# Patient Record
Sex: MALE | Race: WHITE | URBAN - NONMETROPOLITAN AREA
[De-identification: names, ages, dates, MRNs, and addresses within clinical notes are randomized per-mention and may not be internally consistent; named-entity substitution may affect disease eponyms.]

---

## 2018-09-05 ENCOUNTER — OFFICE VISIT (OUTPATIENT)
Dept: URBAN - NONMETROPOLITAN AREA CLINIC 10 | Facility: CLINIC | Age: 63
End: 2018-09-05
Payer: COMMERCIAL

## 2018-09-05 DIAGNOSIS — E11.9 TYPE 2 DIABETES MELLITUS W/O COMPLICATION: ICD-10-CM

## 2018-09-05 PROCEDURE — 92015 DETERMINE REFRACTIVE STATE: CPT | Performed by: OPTOMETRIST

## 2018-09-05 PROCEDURE — 92014 COMPRE OPH EXAM EST PT 1/>: CPT | Performed by: OPTOMETRIST

## 2018-09-05 ASSESSMENT — INTRAOCULAR PRESSURE
OS: 16
OD: 18

## 2018-09-05 ASSESSMENT — VISUAL ACUITY
OD: 20/30
OS: 20/60

## 2018-09-05 NOTE — IMPRESSION/PLAN
Impression: Type 2 diabetes mellitus w/o complication: A82.6.  Plan: Pt under care of Dr. Darion Ordonez

## 2020-11-13 ENCOUNTER — OFFICE VISIT (OUTPATIENT)
Dept: URBAN - NONMETROPOLITAN AREA CLINIC 10 | Facility: CLINIC | Age: 65
End: 2020-11-13

## 2020-11-13 DIAGNOSIS — H43.813 VITREOUS DEGENERATION, BILATERAL: ICD-10-CM

## 2020-11-13 DIAGNOSIS — H35.30 UNSPECIFIED MACULAR DEGENERATION: ICD-10-CM

## 2020-11-13 DIAGNOSIS — H26.491 OTHER SECONDARY CATARACT, RIGHT EYE: ICD-10-CM

## 2020-11-13 PROCEDURE — 92014 COMPRE OPH EXAM EST PT 1/>: CPT | Performed by: OPTOMETRIST

## 2020-11-13 ASSESSMENT — INTRAOCULAR PRESSURE
OD: 17
OS: 17

## 2020-11-13 NOTE — IMPRESSION/PLAN
Impression: Type 2 diabetes mellitus w/o complication: G33.2. Plan: Diabetes type II: no background retinopathy, no signs of neovascularization noted. Discussed ocular and systemic benefits of blood sugar control. COntinue care w/ Dr. Siddiqi Messenger

## 2022-01-19 ENCOUNTER — OFFICE VISIT (OUTPATIENT)
Dept: URBAN - NONMETROPOLITAN AREA CLINIC 10 | Facility: CLINIC | Age: 67
End: 2022-01-19
Payer: MEDICARE

## 2022-01-19 DIAGNOSIS — Z96.1 PRESENCE OF INTRAOCULAR LENS: Primary | ICD-10-CM

## 2022-01-19 DIAGNOSIS — H52.4 PRESBYOPIA: ICD-10-CM

## 2022-01-19 PROCEDURE — 92012 INTRM OPH EXAM EST PATIENT: CPT | Performed by: STUDENT IN AN ORGANIZED HEALTH CARE EDUCATION/TRAINING PROGRAM

## 2022-01-19 ASSESSMENT — INTRAOCULAR PRESSURE
OD: 16
OS: 17

## 2022-01-19 ASSESSMENT — VISUAL ACUITY
OS: 20/150
OD: 20/40

## 2022-01-19 NOTE — IMPRESSION/PLAN
Impression: Diabetes mellitus Type 2 without mention of complication: E05.9. Plan: Continue care with Dr. Cee Schuler. Patient not dilated today.

## 2022-07-19 ENCOUNTER — OFFICE VISIT (OUTPATIENT)
Dept: URBAN - NONMETROPOLITAN AREA CLINIC 6 | Facility: CLINIC | Age: 67
End: 2022-07-19
Payer: MEDICARE

## 2022-07-19 DIAGNOSIS — H35.30 UNSPECIFIED MACULAR DEGENERATION: Primary | ICD-10-CM

## 2022-07-19 DIAGNOSIS — H26.491 OTHER SECONDARY CATARACT, RIGHT EYE: ICD-10-CM

## 2022-07-19 PROCEDURE — 92134 CPTRZ OPH DX IMG PST SGM RTA: CPT | Performed by: OPTOMETRIST

## 2022-07-19 PROCEDURE — 99204 OFFICE O/P NEW MOD 45 MIN: CPT | Performed by: OPTOMETRIST

## 2022-07-19 ASSESSMENT — INTRAOCULAR PRESSURE
OD: 14
OS: 15

## 2022-07-19 NOTE — IMPRESSION/PLAN
Impression: Unspecified macular degeneration: H35.30. Plan: OCT Mac completed today. Discussed findings in detail. Peripheral thinning OU. Discussed it could be related to the dialysis. Recommend follow up appt with Dr. Cee Schuler sooner than September.

## 2022-07-19 NOTE — IMPRESSION/PLAN
Impression: Other secondary cataract, right eye: H26.491. Plan: Discussed treatment options with patient. YAG laser capsulotomy is recommended. Surgical risks and benefits were discussed in detail, explained, and understood by patient. Patient defers to have surgery at this time, will continue with current glasses Rx. Will re-evaluate at next visit.

## 2022-10-13 ENCOUNTER — OFFICE VISIT (OUTPATIENT)
Dept: URBAN - NONMETROPOLITAN AREA CLINIC 6 | Facility: CLINIC | Age: 67
End: 2022-10-13
Payer: MEDICARE

## 2022-10-13 DIAGNOSIS — H35.30 UNSPECIFIED MACULAR DEGENERATION: ICD-10-CM

## 2022-10-13 DIAGNOSIS — H26.491 OTHER SECONDARY CATARACT, RIGHT EYE: Primary | ICD-10-CM

## 2022-10-13 PROCEDURE — 99204 OFFICE O/P NEW MOD 45 MIN: CPT | Performed by: OPHTHALMOLOGY

## 2022-10-13 ASSESSMENT — VISUAL ACUITY
OS: 20/500
OD: 20/40

## 2022-10-13 ASSESSMENT — INTRAOCULAR PRESSURE
OS: 15
OD: 15

## 2022-10-13 NOTE — IMPRESSION/PLAN
Impression: Unspecified macular degeneration: H35.30. Plan: Continue care with Dr. Mayelin Saha every 6 months.

## 2022-10-26 ENCOUNTER — SURGERY (OUTPATIENT)
Dept: URBAN - NONMETROPOLITAN AREA SURGERY 1 | Facility: SURGERY | Age: 67
End: 2022-10-26
Payer: MEDICARE

## 2022-10-26 PROCEDURE — 66821 AFTER CATARACT LASER SURGERY: CPT | Performed by: OPHTHALMOLOGY

## 2022-11-10 ENCOUNTER — POST-OPERATIVE VISIT (OUTPATIENT)
Dept: URBAN - NONMETROPOLITAN AREA CLINIC 6 | Facility: CLINIC | Age: 67
End: 2022-11-10
Payer: MEDICARE

## 2022-11-10 DIAGNOSIS — Z48.810 ENCOUNTER FOR SURGICAL AFTERCARE FOLLOWING SURGERY ON A SENSE ORGAN: Primary | ICD-10-CM

## 2022-11-10 PROCEDURE — 99024 POSTOP FOLLOW-UP VISIT: CPT | Performed by: OPTOMETRIST

## 2022-11-10 ASSESSMENT — INTRAOCULAR PRESSURE: OD: 15

## 2022-11-10 NOTE — IMPRESSION/PLAN
Impression: S/P YAG Capsulotomy (Yttrium Aluminum [place]) OD - 15 Days. Encounter for surgical aftercare following surgery on a sense organ  Z48.810. Post operative instructions reviewed - Condition is improving - Plan: PVD without retinal pathology. No family history, prior peripheral retinal disease, or other risk factors evident. Warning signs of retinal tear and detachment discussed with patient. To return to clinic STAT if any change in symptoms consistent with RT or RD. --Advised patient to use artificial tears for comfort.

## 2023-01-20 ENCOUNTER — OFFICE VISIT (OUTPATIENT)
Dept: URBAN - NONMETROPOLITAN AREA CLINIC 10 | Facility: CLINIC | Age: 68
End: 2023-01-20
Payer: MEDICARE

## 2023-01-20 DIAGNOSIS — H35.3124 NONEXUDATIVE MACULAR DEGENERATION, ADVANCED ATROPHIC WITH SUBFOVEAL INVOLVEMENT, LEFT EYE: Primary | ICD-10-CM

## 2023-01-20 DIAGNOSIS — E11.9 TYPE 2 DIABETES MELLITUS W/O COMPLICATION: ICD-10-CM

## 2023-01-20 DIAGNOSIS — H35.3112 NONEXUDATIVE MACULAR DEGENERATION, INTERMEDIATE DRY STAGE, RIGHT EYE: ICD-10-CM

## 2023-01-20 DIAGNOSIS — H52.4 PRESBYOPIA: ICD-10-CM

## 2023-01-20 PROCEDURE — 92014 COMPRE OPH EXAM EST PT 1/>: CPT | Performed by: STUDENT IN AN ORGANIZED HEALTH CARE EDUCATION/TRAINING PROGRAM

## 2023-01-20 ASSESSMENT — INTRAOCULAR PRESSURE
OD: 16
OS: 16

## 2023-01-20 ASSESSMENT — VISUAL ACUITY
OD: 20/40
OS: 20/400

## 2023-01-20 NOTE — IMPRESSION/PLAN
Impression: Nonexudative macular degeneration, intermediate dry stage, right eye: H35.1107.  Plan: see above

## 2023-01-20 NOTE — IMPRESSION/PLAN
Impression: Nonexudative macular degeneration, advanced atrophic with subfoveal involvement, left eye: H35.5213. Plan: Continue care with Dr. Daniel Bills.

## 2023-01-20 NOTE — IMPRESSION/PLAN
Impression: Type 2 diabetes mellitus w/o complication: Q28.0. Plan: No background diabetic retinopathy, no signs of neovascularization noted. Counseled patient regarding ocular and systemic benefits of blood sugar control. Note to be sent to patient's PCP detailing findings.